# Patient Record
Sex: FEMALE | Race: WHITE | NOT HISPANIC OR LATINO | URBAN - METROPOLITAN AREA
[De-identification: names, ages, dates, MRNs, and addresses within clinical notes are randomized per-mention and may not be internally consistent; named-entity substitution may affect disease eponyms.]

---

## 2017-10-02 ENCOUNTER — RECORDS - HEALTHEAST (OUTPATIENT)
Dept: LAB | Facility: CLINIC | Age: 37
End: 2017-10-02

## 2017-10-02 LAB
ABORH_EXT (HISTORICAL CONVERSION): NORMAL
ANTIBODY_EXT (HISTORICAL CONVERSION): NEGATIVE
HBSAG_EXT (HISTORICAL CONVERSION): NEGATIVE
HBV SURFACE AG SERPL QL IA: NEGATIVE
HCV AB SERPL QL IA: NEGATIVE
HIV 1+2 AB+HIV1 P24 AG SERPL QL IA: NEGATIVE
RPR - HISTORICAL: NORMAL
RUBELLA_EXT (HISTORICAL CONVERSION): NORMAL

## 2017-10-03 LAB — SYPHILIS RPR SCREEN - HISTORICAL: NORMAL

## 2018-05-18 ENCOUNTER — RECORDS - HEALTHEAST (OUTPATIENT)
Dept: ADMINISTRATIVE | Facility: OTHER | Age: 38
End: 2018-05-18

## 2018-05-29 ENCOUNTER — RECORDS - HEALTHEAST (OUTPATIENT)
Dept: ADMINISTRATIVE | Facility: OTHER | Age: 38
End: 2018-05-29

## 2018-06-01 ENCOUNTER — RECORDS - HEALTHEAST (OUTPATIENT)
Dept: ADMINISTRATIVE | Facility: OTHER | Age: 38
End: 2018-06-01

## 2018-06-02 ENCOUNTER — HOSPITAL ENCOUNTER (OUTPATIENT)
Dept: OBGYN | Facility: HOSPITAL | Age: 38
Discharge: HOME | End: 2018-06-02
Attending: OBSTETRICS & GYNECOLOGY | Admitting: OBSTETRICS & GYNECOLOGY

## 2018-06-02 LAB
ABO/RH(D): NORMAL
COMPONENT (HISTORICAL CONVERSION): NORMAL
FBS KIT EXPIRATION DATE: NORMAL
FBS KIT LOT #: NORMAL
FETAL BLEED SCREEN: NORMAL
LOT NUMBER (HISTORICAL CONVERSION): NORMAL
STATUS (HISTORICAL CONVERSION): NORMAL

## 2018-06-09 ENCOUNTER — SURGERY - HEALTHEAST (OUTPATIENT)
Dept: OBGYN | Facility: HOSPITAL | Age: 38
End: 2018-06-09

## 2018-06-09 ENCOUNTER — ANESTHESIA - HEALTHEAST (OUTPATIENT)
Dept: OBGYN | Facility: HOSPITAL | Age: 38
End: 2018-06-09

## 2018-06-09 ASSESSMENT — MIFFLIN-ST. JEOR: SCORE: 1693.27

## 2021-06-01 VITALS — BODY MASS INDEX: 39.14 KG/M2 | HEIGHT: 64 IN

## 2021-06-01 VITALS — WEIGHT: 230 LBS | HEIGHT: 64 IN | BODY MASS INDEX: 39.27 KG/M2

## 2021-06-18 NOTE — ANESTHESIA POSTPROCEDURE EVALUATION
Patient: Vicky Epstein   SECTION, REPEAT  Anesthesia type: epidural    Patient location: Labor and Delivery  Last vitals:   Vitals:    06/10/18 0500   BP:    Pulse: 66   Resp:    Temp:    SpO2: 97%     Post vital signs: stable  Level of consciousness: awake and responds to simple questions  Post-anesthesia pain: pain controlled  Post-anesthesia nausea and vomiting: no  Pulmonary: unassisted, return to baseline  Cardiovascular: stable and blood pressure at baseline  Hydration: adequate  Anesthetic events: no    QCDR Measures:  ASA# 11 - Elaina-op Cardiac Arrest: ASA11B - Patient did NOT experience unanticipated cardiac arrest  ASA# 12 - Elaina-op Mortality Rate: ASA12B - Patient did NOT die  ASA# 13 - PACU Re-Intubation Rate: ASA13B - Patient did NOT require a new airway mgmt  ASA# 10 - Composite Anes Safety: ASA10A - No serious adverse event    Additional Notes:

## 2021-06-18 NOTE — ANESTHESIA PREPROCEDURE EVALUATION
Anesthesia Evaluation      Patient summary reviewed   No history of anesthetic complications     Airway   Mallampati: II  Neck ROM: full   Pulmonary - negative ROS and normal exam    breath sounds clear to auscultation                         Cardiovascular - negative ROS  Rhythm: regular  Rate: normal,         Neuro/Psych      Comments: Scoliosis  Sacrum/coccyx fracture    Endo/Other       Comments: Hx prior  complicated by ovarian vein thrombosis. Not on anticoagulation.    GI/Hepatic/Renal - negative ROS      Other findings:   Planning for ECV under epidural.  if unsuccessful, induction of labor if successful.      Dental - normal exam                        Anesthesia Plan  Planned anesthetic: epidural  Patient interviewed and examined at the bedside with RN present throughout. Discussed with patient the procedure of epidural placement, expectations, and risks including but not limited to: decreased blood pressure, poor analgesia possibly requiring replacement, post-dural puncture headache, bleeding, infection, nerve damage, and high spinal block. All questions answered. Patient consents to proceed with epidural placement.    ASA 2 - emergent     Anesthetic plan and risks discussed with: patient and spouse    Post-op plan: routine recovery          ADDENDUM:    Patient to go for  this evening for failed ECV. Patient has a working epidural. TAP blocks for post-op pain as requested by surgeon.        Results for DYLLAN FOX (MRN [id number]) as of 2018   Ref. Range 2018 16:26   WBC Latest Ref Range: 4.0 - 11.0 thou/uL 10.6   RBC Latest Ref Range: 3.80 - 5.40 mill/uL 4.10   Hemoglobin Latest Ref Range: 12.0 - 16.0 g/dL 12.6   Hematocrit Latest Ref Range: 35.0 - 47.0 % 36.0   MCV Latest Ref Range: 80 - 100 fL 88   MCH Latest Ref Range: 27.0 - 34.0 pg 30.7   MCHC Latest Ref Range: 32.0 - 36.0 g/dL 35.0   RDW Latest Ref Range: 11.0 - 14.5 % 14.5   Platelets Latest Ref Range: 140 -  440 thou/uL 261   MPV Latest Ref Range: 8.5 - 12.5 fL 11.8   ABORh Unknown A NEG   Antibody Screen Latest Ref Range: Negative  NEG       Estephanie Cantu MD  Staff Anesthesiologist  [facility]  6/9/18 5:57 PM

## 2021-06-18 NOTE — H&P
6-2-18 Vicky Epstein  [birth year]    CC: Breech at 40w 1d    HPI: Pt is a 37-year-old  white female with one prior C/S who presents for external cephalic version for a breech presentation.     PMH, PFSH, and ROS: See prenatal record on file.    PE: Bp , T 98.1.  Abd: Gravid uterus with FH of 41cm. Vtx in the LUQ. Anterior placenta    Pt is Rh Neg.    Discussed ECV and risks. Pt is agreeable to proceed and signed appropriate consent form.    ECV attempted, first forward roll, then back flip, neither successful.    A: IUP at 40w 1d. Breech. Failed ECV    P: Recheck in office on Wednesday. Call if ROM or labor. If baby does not change on her own, will need C/S.    Yoni Villalba MD

## 2021-06-18 NOTE — ANESTHESIA PROCEDURE NOTES
Peripheral Block    Patient location during procedure: OR  Start time: 6/9/2018 9:04 PM  End time: 6/9/2018 9:08 PM  post-op analgesia per surgeon order as noted in medical record  Staffing:  Performing  Anesthesiologist: ESTEPHANIE CANTU  Performing CRNA: BERNARD CARDOZA  Preanesthetic Checklist  Completed: patient identified, site marked, risks, benefits, and alternatives discussed, timeout performed, consent obtained, airway assessed, oxygen available, suction available, emergency drugs available and hand hygiene performed  Peripheral Block  Block type: other  Prep: ChloraPrep  Patient position: supine  Patient monitoring: cardiac monitor, continuous pulse oximetry, heart rate and blood pressure  Anesthesia laterality: B/L.            Needle  Needle type: echogenic   Needle gauge: 20G  Needle length: 4 in  no peripheral nerve catheter placed  Assessment  Injection assessment: no difficulty with injection, negative aspiration for heme, no paresthesia on injection and incremental injection  Additional Notes      Confirmed no local anesthetic given by surgery team. Confirmed request for TAP block by surgery team. Bilateral transversus abdominis plane (TAP) blocks with bupivacaine 0.25% 20 cc per side. Patient tolerated procedure well.    Estephanie Cantu MD  Staff Anesthesiologist  [facility]

## 2021-06-18 NOTE — ANESTHESIA CARE TRANSFER NOTE
Last vitals:   Vitals:    06/09/18 2119   BP: 130/63   Pulse: 89   Resp: 12   Temp: 36.9  C (98.4  F)   SpO2: 99%     Patient's level of consciousness is drowsy  Spontaneous respirations: yes  Maintains airway independently: yes  Dentition unchanged: yes  Oropharynx: oropharynx clear of all foreign objects    QCDR Measures:  ASA# 20 - Surgical Safety Checklist: WHO surgical safety checklist completed prior to induction  PQRS# 430 - Adult PONV Prevention: 4558F - Pt received => 2 anti-emetic agents (different classes) preop & intraop  ASA# 8 - Peds PONV Prevention: NA - Not pediatric patient, not GA or 2 or more risk factors NOT present  PQRS# 424 - Elaina-op Temp Management: 4559F - At least one body temp DOCUMENTED => 35.5C or 95.9F within required timeframe  PQRS# 426 - PACU Transfer Protocol: - Transfer of care checklist used  ASA# 14 - Acute Post-op Pain: ASA14B - Patient did NOT experience pain >= 7 out of 10

## 2021-06-18 NOTE — ANESTHESIA PROCEDURE NOTES
Epidural Block    Patient location during procedure: OB  Time Called: 6/9/2018 3:22 PM  Reason for Block:labor epidural  Staffing:  Performing  Anesthesiologist: EMMA PEARSON  Preanesthetic Checklist  Completed: patient identified, risks, benefits, and alternatives discussed, timeout performed, consent obtained, at patient's request, airway assessed, oxygen available, suction available, emergency drugs available and hand hygiene performed  Procedure  Patient position: sitting  Prep: ChloraPrep and site prepped and draped  Patient monitoring: continuous pulse oximetry, heart rate and blood pressure  Approach: midline  Location: L2-L3  Injection technique: BHAVANI saline  Number of Attempts:2 (1st attempt @ L3-4 positive test dose. 2nd attempt negative test dose.)  Needle  Needle type: [employer]   Needle gauge: 18 G     Catheter in Space: 5  Assessment  Sensory level:  No complications      Additional Notes:  BHAVANI @ 7 cm, taped @ 12 cm

## 2021-06-18 NOTE — PROGRESS NOTES
Discharge paperwork reviewed, pt and spouse denied questions, verbalize understanding of paperwork and to follow-up with Dr. Villalba on Tuesday or Wednesday this week.